# Patient Record
Sex: FEMALE | Race: WHITE | NOT HISPANIC OR LATINO | Employment: OTHER | URBAN - NONMETROPOLITAN AREA
[De-identification: names, ages, dates, MRNs, and addresses within clinical notes are randomized per-mention and may not be internally consistent; named-entity substitution may affect disease eponyms.]

---

## 2018-06-01 ENCOUNTER — OFFICE VISIT (OUTPATIENT)
Dept: OBSTETRICS AND GYNECOLOGY | Facility: CLINIC | Age: 63
End: 2018-06-01

## 2018-06-01 ENCOUNTER — PREP FOR SURGERY (OUTPATIENT)
Dept: OTHER | Facility: HOSPITAL | Age: 63
End: 2018-06-01

## 2018-06-01 VITALS
BODY MASS INDEX: 36.82 KG/M2 | SYSTOLIC BLOOD PRESSURE: 128 MMHG | DIASTOLIC BLOOD PRESSURE: 70 MMHG | WEIGHT: 221 LBS | HEIGHT: 65 IN

## 2018-06-01 DIAGNOSIS — R10.2 PELVIC PAIN: ICD-10-CM

## 2018-06-01 DIAGNOSIS — N94.10 DYSPAREUNIA IN FEMALE: ICD-10-CM

## 2018-06-01 DIAGNOSIS — N95.2 POSTMENOPAUSAL ATROPHIC VAGINITIS: ICD-10-CM

## 2018-06-01 DIAGNOSIS — R93.5 ABNORMAL ULTRASOUND OF ENDOMETRIUM: ICD-10-CM

## 2018-06-01 DIAGNOSIS — N95.0 POST-MENOPAUSAL BLEEDING: Primary | ICD-10-CM

## 2018-06-01 DIAGNOSIS — N95.1 MENOPAUSAL SYMPTOMS: ICD-10-CM

## 2018-06-01 DIAGNOSIS — N95.0 POSTMENOPAUSAL BLEEDING: Primary | ICD-10-CM

## 2018-06-01 DIAGNOSIS — D25.2 SUBSEROUS LEIOMYOMA OF UTERUS: ICD-10-CM

## 2018-06-01 PROCEDURE — 93005 ELECTROCARDIOGRAM TRACING: CPT | Performed by: OBSTETRICS & GYNECOLOGY

## 2018-06-01 PROCEDURE — 99204 OFFICE O/P NEW MOD 45 MIN: CPT | Performed by: OBSTETRICS & GYNECOLOGY

## 2018-06-01 RX ORDER — THYROID,PORK 32.5 MG
TABLET ORAL
Refills: 5 | COMMUNITY
Start: 2018-05-23

## 2018-06-01 RX ORDER — CHLORHEXIDINE GLUCONATE 0.12 MG/ML
RINSE ORAL
Refills: 0 | COMMUNITY
Start: 2018-04-10 | End: 2018-06-19

## 2018-06-01 RX ORDER — LEVOTHYROXINE SODIUM 75 MCG
TABLET ORAL DAILY
Refills: 5 | COMMUNITY
Start: 2018-04-26

## 2018-06-01 RX ORDER — ESTRADIOL 0.1 MG/G
CREAM VAGINAL
Qty: 1 EACH | Refills: 11 | Status: SHIPPED | OUTPATIENT
Start: 2018-06-01

## 2018-06-01 RX ORDER — OMEPRAZOLE 40 MG/1
CAPSULE, DELAYED RELEASE ORAL
Refills: 3 | COMMUNITY
Start: 2018-04-30

## 2018-06-01 RX ORDER — SODIUM CHLORIDE 0.9 % (FLUSH) 0.9 %
1-10 SYRINGE (ML) INJECTION AS NEEDED
Status: CANCELLED | OUTPATIENT
Start: 2018-06-01

## 2018-06-01 RX ORDER — KETOCONAZOLE 20 MG/ML
1 SHAMPOO TOPICAL EVERY OTHER DAY
Refills: 5 | COMMUNITY
Start: 2018-05-01

## 2018-06-01 NOTE — PROGRESS NOTES
Subjective  Chief Complaint   Patient presents with   • Pelvic Pain   • Vaginal Bleeding   • Dyspareunia     Patient is 62 y.o.  here for evaluation of recent  bleeding.  Pt gives history of being on HRT for the last several years.  Pt see's Dr. Yoo in [place].  Pt has labs done then has adjustments made to her hormone regimen which is compounded.  Pt is currently on 7 mg estradiol; pt reports dose had been 10 mg but was decreased recently secondary to the bleeding.  Pt is on progesterone 400 mg po.  Pt reports having temperature in balance.  Pt has had hot flashes, night sweats; now controlled with medication.  Pt reports recently having abdominal and pelvic pain; pt felt like spasms in ureter.  Pt also with pelvic pressure.  Pt also had urinary hesitancy as well as frequency.  Pt with no dysuria.  Pt saw primary care; had ua checked and vaginal cultures.  Pt treated for BV with oral antibiotics; ?flagyl.  Pt also reports urine grew out GBS; pt treated with emycin.  Pt reports symptoms improved.  Pt then attempted intercourse which she had not had in a while; pt reports severe pelvic pain and pain at vaginal introitus.  Pt reports pain was excruciating.  Pt reports the onset of vaginal bleeding 5 days later; pt reports passing clots and cramping like a menses.  Pt reports having TVS in past with Dr. Ferrari.  Pt not aware of any fibroid tumors.  Pt with no family history of gyn malignancy.    History  Past Medical History:   Diagnosis Date   • Asthma    • Disease of thyroid gland    • GERD (gastroesophageal reflux disease)    • Hormone replacement therapy    • Kidney stone    • Osteoarthritis    • Ovarian cyst    • Post-menopausal bleeding      No current outpatient prescriptions on file prior to visit.     No current facility-administered medications on file prior to visit.      Allergies   Allergen Reactions   • Bactrim [Sulfamethoxazole-Trimethoprim] Swelling   • Ciprofloxacin Swelling   •  "Morphine And Related Nausea And Vomiting   • Bacitracin Swelling   • Naproxen Dizziness   • Codeine Rash   • Gentamicin Rash   • Penicillins Rash   • Percocet [Oxycodone-Acetaminophen] Rash   • Sulfa Antibiotics Rash   • Ultram [Tramadol Hcl] Rash     Past Surgical History:   Procedure Laterality Date   •  SECTION  /85    X 3   • DILATATION AND CURETTAGE     • HX OVARIAN CYSTECTOMY     • KNEE SURGERY     • WISDOM TOOTH EXTRACTION       Family History   Problem Relation Age of Onset   • Hypertension Other    • Diabetes Other    • Coronary artery disease Other    • Stroke Other    • Thyroid disease Other      Social History     Social History   • Marital status:      Social History Main Topics   • Smoking status: Never Smoker   • Smokeless tobacco: Never Used   • Alcohol use Yes      Comment: 2   • Drug use: No   • Sexual activity: Yes     Partners: Male     Birth control/ protection: Post-menopausal     Other Topics Concern   • Not on file     Review of Systems  All systems were reviewed and negative except for:  ENT:  positive for ear ringing  Genitourinary: postivie for  frequency, urinary incontinence, abnormal vaginal bleeding, painful intercourse and pelvic pain  Endocrine: positive for  temperature intolerance     Objective  Vitals:    18 1440   BP: 128/70   Weight: 100 kg (221 lb)   Height: 165.1 cm (65\")     Physical Exam:  General Appearance: alert, appears stated age and cooperative  Head: normocephalic, without obvious abnormality and atraumatic  Eyes: lids and lashes normal, conjunctivae and sclerae normal, no icterus, no pallor, corneas clear and PERRLA  Ears: ears appear intact with no abnormalities noted  Nose: nares normal, septum midline, mucosa normal and no drainage  Neck: suppple, trachea midline and no thyromegaly  Lungs: clear to auscultation, respirations regular, respirations even and respirations unlabored  Heart: regular rhythm and normal rate, normal S1, S2, no " murmur, gallop, or rubs and no click  Breasts: Not performed.  Abdomen: normal bowel sounds, no masses, no hepatomegaly, no splenomegaly, soft non-tender, no guarding and no rebound tenderness  Pelvic: Not performed.  Extremities: moves extremities well, no edema, no cyanosis and no redness  Skin: no bleeding, bruising or rash and no lesions noted  Lymph Nodes: no palpable adenopathy  Neuro: CN II-X grossly intact; sensation intact  Psych: normal mood and affect, oriented to person, time and place, thought content organized and appropriate judgment  Lab Review   No data reviewed    Imaging   Pelvic ultrasound report  Pelvic ultrasound images independantly reviewed - TVS today shows AV uterus; ET 6 mm; subserosal leiomyoma noted measuring 3.2 cm; bilateral adnexa normal; no masses seen; normal vascular flow;  no free fluid noted    Assessment/Plan  Problem List Items Addressed This Visit        Genitourinary    Post-menopausal bleeding - Primary  Pt with recent episode of  bleeding.  Pt on HRT.  Long discussion with patient regarding treatment options but recommend endometrial sampling.  Pt desires to schedule D&C with dx hysteroscopy.  Risks, complications, benefits, and other alternatives discussed.    Relevant Orders    US Non-ob Transvaginal      Other Visit Diagnoses     Menopausal symptoms      Long discussion with patient regarding treatment options for HRT.  Also discussed bioidentical hormones as well as risk of HRT including results of WHI study.  All questions answered.    Abnormal ultrasound of endometrium      See plan above    Subserous leiomyoma of uterus      Small, subserosal leiomyoma as noted above.  Pt to sign to obtain copy of previous   TVS for comparison.  Plan pending results but at minimum recommend repeat TVS in 3-4 months to insure stability.    Postmenopausal atrophic vaginitis      Discussed various options for relief of atrophic vaginal symptoms related to menopause. Discussed local  therapy for treatment of vaginal symptoms only.  Discussed the different formulation options including cream, ring, and tablets.  Discussed the low risk of systemic absorption in postmenopausal women with atrophy using 25 mcgs of estradiol on a daily basis.  Recommend low dose use 2-3x/wk for maintenance of treatment.  Other treatment options were discussed including the use of water-based and silicone-based vaginal lubricants and moisturizers.  Also discussed was the FDA approved treatment option of ospemifene for moderate to severe dyspareunia.  Rx estrogen cream given as noted.  Pt also instructed to use periurethral area.    Dyspareunia in female      See plan above.  Pt also instructed in the use of water based lubricants.    Pelvic pain      See plan above        Follow up as discussed   This note was electronically signed.  Alena Beard M.D.

## 2018-06-19 ENCOUNTER — APPOINTMENT (OUTPATIENT)
Dept: PREADMISSION TESTING | Facility: HOSPITAL | Age: 63
End: 2018-06-19

## 2018-06-19 VITALS — HEIGHT: 62 IN | BODY MASS INDEX: 40.67 KG/M2 | WEIGHT: 221 LBS

## 2018-06-19 DIAGNOSIS — N95.0 POSTMENOPAUSAL BLEEDING: ICD-10-CM

## 2018-06-19 LAB
ANION GAP SERPL CALCULATED.3IONS-SCNC: 15.1 MMOL/L (ref 10–20)
BASOPHILS # BLD AUTO: 0.03 10*3/MM3 (ref 0–0.2)
BASOPHILS NFR BLD AUTO: 0.4 % (ref 0–2.5)
BILIRUB UR QL STRIP: NEGATIVE
BUN BLD-MCNC: 14 MG/DL (ref 7–20)
BUN/CREAT SERPL: 20 (ref 7.1–23.5)
CALCIUM SPEC-SCNC: 9.4 MG/DL (ref 8.4–10.2)
CHLORIDE SERPL-SCNC: 105 MMOL/L (ref 98–107)
CLARITY UR: CLEAR
CO2 SERPL-SCNC: 25 MMOL/L (ref 26–30)
COLOR UR: YELLOW
CREAT BLD-MCNC: 0.7 MG/DL (ref 0.6–1.3)
DEPRECATED RDW RBC AUTO: 46.6 FL (ref 37–54)
EOSINOPHIL # BLD AUTO: 0.28 10*3/MM3 (ref 0–0.7)
EOSINOPHIL NFR BLD AUTO: 4.1 % (ref 0–7)
ERYTHROCYTE [DISTWIDTH] IN BLOOD BY AUTOMATED COUNT: 13.5 % (ref 11.5–14.5)
GFR SERPL CREATININE-BSD FRML MDRD: 84 ML/MIN/1.73
GLUCOSE BLD-MCNC: 85 MG/DL (ref 74–98)
GLUCOSE UR STRIP-MCNC: NEGATIVE MG/DL
HCT VFR BLD AUTO: 45.3 % (ref 37–47)
HGB BLD-MCNC: 14.6 G/DL (ref 12–16)
HGB UR QL STRIP.AUTO: NEGATIVE
IMM GRANULOCYTES # BLD: 0.01 10*3/MM3 (ref 0–0.06)
IMM GRANULOCYTES NFR BLD: 0.1 % (ref 0–0.6)
KETONES UR QL STRIP: NEGATIVE
LEUKOCYTE ESTERASE UR QL STRIP.AUTO: NEGATIVE
LYMPHOCYTES # BLD AUTO: 2.5 10*3/MM3 (ref 0.6–3.4)
LYMPHOCYTES NFR BLD AUTO: 36.7 % (ref 10–50)
MCH RBC QN AUTO: 30.2 PG (ref 27–31)
MCHC RBC AUTO-ENTMCNC: 32.2 G/DL (ref 30–37)
MCV RBC AUTO: 93.6 FL (ref 81–99)
MONOCYTES # BLD AUTO: 0.46 10*3/MM3 (ref 0–0.9)
MONOCYTES NFR BLD AUTO: 6.8 % (ref 0–12)
NEUTROPHILS # BLD AUTO: 3.53 10*3/MM3 (ref 2–6.9)
NEUTROPHILS NFR BLD AUTO: 51.9 % (ref 37–80)
NITRITE UR QL STRIP: NEGATIVE
NRBC BLD MANUAL-RTO: 0 /100 WBC (ref 0–0)
PH UR STRIP.AUTO: 6 [PH] (ref 5–8)
PLATELET # BLD AUTO: 206 10*3/MM3 (ref 130–400)
PMV BLD AUTO: 11.4 FL (ref 6–12)
POTASSIUM BLD-SCNC: 4.1 MMOL/L (ref 3.5–5.1)
PROT UR QL STRIP: NEGATIVE
RBC # BLD AUTO: 4.84 10*6/MM3 (ref 4.2–5.4)
SODIUM BLD-SCNC: 141 MMOL/L (ref 137–145)
SP GR UR STRIP: 1.01 (ref 1–1.03)
UROBILINOGEN UR QL STRIP: NORMAL
WBC NRBC COR # BLD: 6.81 10*3/MM3 (ref 4.8–10.8)

## 2018-06-19 PROCEDURE — 81003 URINALYSIS AUTO W/O SCOPE: CPT | Performed by: OBSTETRICS & GYNECOLOGY

## 2018-06-19 PROCEDURE — 93005 ELECTROCARDIOGRAM TRACING: CPT | Performed by: OBSTETRICS & GYNECOLOGY

## 2018-06-19 PROCEDURE — 80048 BASIC METABOLIC PNL TOTAL CA: CPT | Performed by: OBSTETRICS & GYNECOLOGY

## 2018-06-19 PROCEDURE — 36415 COLL VENOUS BLD VENIPUNCTURE: CPT

## 2018-06-19 PROCEDURE — 85025 COMPLETE CBC W/AUTO DIFF WBC: CPT | Performed by: OBSTETRICS & GYNECOLOGY

## 2018-07-02 PROCEDURE — S0260 H&P FOR SURGERY: HCPCS | Performed by: OBSTETRICS & GYNECOLOGY

## 2018-07-03 ENCOUNTER — HOSPITAL ENCOUNTER (OUTPATIENT)
Facility: HOSPITAL | Age: 63
Setting detail: HOSPITAL OUTPATIENT SURGERY
Discharge: HOME | End: 2018-07-03
Attending: OBSTETRICS & GYNECOLOGY | Admitting: OBSTETRICS & GYNECOLOGY

## 2018-07-03 ENCOUNTER — ANESTHESIA (OUTPATIENT)
Dept: PERIOP | Facility: HOSPITAL | Age: 63
End: 2018-07-03

## 2018-07-03 ENCOUNTER — ANESTHESIA EVENT (OUTPATIENT)
Dept: PERIOP | Facility: HOSPITAL | Age: 63
End: 2018-07-03

## 2018-07-03 VITALS
OXYGEN SATURATION: 98 % | SYSTOLIC BLOOD PRESSURE: 154 MMHG | TEMPERATURE: 97.8 F | HEART RATE: 60 BPM | RESPIRATION RATE: 18 BRPM | DIASTOLIC BLOOD PRESSURE: 60 MMHG

## 2018-07-03 DIAGNOSIS — N95.0 POSTMENOPAUSAL BLEEDING: ICD-10-CM

## 2018-07-03 PROCEDURE — 25010000002 DEXAMETHASONE PER 1 MG: Performed by: NURSE ANESTHETIST, CERTIFIED REGISTERED

## 2018-07-03 PROCEDURE — 58558 HYSTEROSCOPY BIOPSY: CPT | Performed by: OBSTETRICS & GYNECOLOGY

## 2018-07-03 PROCEDURE — 25010000002 MIDAZOLAM PER 1 MG: Performed by: NURSE ANESTHETIST, CERTIFIED REGISTERED

## 2018-07-03 PROCEDURE — 25010000002 ONDANSETRON PER 1 MG: Performed by: NURSE ANESTHETIST, CERTIFIED REGISTERED

## 2018-07-03 PROCEDURE — 25010000002 PROPOFOL 10 MG/ML EMULSION: Performed by: NURSE ANESTHETIST, CERTIFIED REGISTERED

## 2018-07-03 PROCEDURE — 25010000002 KETOROLAC TROMETHAMINE PER 15 MG: Performed by: NURSE ANESTHETIST, CERTIFIED REGISTERED

## 2018-07-03 RX ORDER — SODIUM CHLORIDE 0.9 % (FLUSH) 0.9 %
1-10 SYRINGE (ML) INJECTION AS NEEDED
Status: DISCONTINUED | OUTPATIENT
Start: 2018-07-03 | End: 2018-07-03 | Stop reason: HOSPADM

## 2018-07-03 RX ORDER — DEXAMETHASONE SODIUM PHOSPHATE 4 MG/ML
INJECTION, SOLUTION INTRA-ARTICULAR; INTRALESIONAL; INTRAMUSCULAR; INTRAVENOUS; SOFT TISSUE AS NEEDED
Status: DISCONTINUED | OUTPATIENT
Start: 2018-07-03 | End: 2018-07-03 | Stop reason: SURG

## 2018-07-03 RX ORDER — SODIUM CHLORIDE 0.9 % (FLUSH) 0.9 %
3 SYRINGE (ML) INJECTION AS NEEDED
Status: DISCONTINUED | OUTPATIENT
Start: 2018-07-03 | End: 2018-07-03 | Stop reason: HOSPADM

## 2018-07-03 RX ORDER — MIDAZOLAM HYDROCHLORIDE 1 MG/ML
INJECTION INTRAMUSCULAR; INTRAVENOUS AS NEEDED
Status: DISCONTINUED | OUTPATIENT
Start: 2018-07-03 | End: 2018-07-03 | Stop reason: SURG

## 2018-07-03 RX ORDER — HYDROCODONE BITARTRATE AND ACETAMINOPHEN 5; 325 MG/1; MG/1
1 TABLET ORAL EVERY 6 HOURS PRN
Qty: 12 TABLET | Refills: 0 | Status: SHIPPED | OUTPATIENT
Start: 2018-07-03 | End: 2018-07-03 | Stop reason: HOSPADM

## 2018-07-03 RX ORDER — KETOROLAC TROMETHAMINE 30 MG/ML
INJECTION, SOLUTION INTRAMUSCULAR; INTRAVENOUS AS NEEDED
Status: DISCONTINUED | OUTPATIENT
Start: 2018-07-03 | End: 2018-07-03 | Stop reason: SURG

## 2018-07-03 RX ORDER — ONDANSETRON 2 MG/ML
4 INJECTION INTRAMUSCULAR; INTRAVENOUS ONCE
Status: COMPLETED | OUTPATIENT
Start: 2018-07-03 | End: 2018-07-03

## 2018-07-03 RX ORDER — PROMETHAZINE HYDROCHLORIDE 25 MG/ML
12.5 INJECTION, SOLUTION INTRAMUSCULAR; INTRAVENOUS ONCE AS NEEDED
Status: DISCONTINUED | OUTPATIENT
Start: 2018-07-03 | End: 2018-07-03 | Stop reason: HOSPADM

## 2018-07-03 RX ORDER — ONDANSETRON 2 MG/ML
4 INJECTION INTRAMUSCULAR; INTRAVENOUS ONCE AS NEEDED
Status: DISCONTINUED | OUTPATIENT
Start: 2018-07-03 | End: 2018-07-03 | Stop reason: HOSPADM

## 2018-07-03 RX ORDER — ONDANSETRON 2 MG/ML
INJECTION INTRAMUSCULAR; INTRAVENOUS AS NEEDED
Status: DISCONTINUED | OUTPATIENT
Start: 2018-07-03 | End: 2018-07-03 | Stop reason: SURG

## 2018-07-03 RX ORDER — SODIUM CHLORIDE 0.9 % (FLUSH) 0.9 %
3 SYRINGE (ML) INJECTION AS NEEDED
Status: DISCONTINUED | OUTPATIENT
Start: 2018-07-03 | End: 2018-07-03

## 2018-07-03 RX ORDER — MEPERIDINE HYDROCHLORIDE 50 MG/ML
INJECTION INTRAMUSCULAR; INTRAVENOUS; SUBCUTANEOUS AS NEEDED
Status: DISCONTINUED | OUTPATIENT
Start: 2018-07-03 | End: 2018-07-03 | Stop reason: SURG

## 2018-07-03 RX ORDER — PROMETHAZINE HYDROCHLORIDE 12.5 MG/1
12.5 TABLET ORAL ONCE AS NEEDED
Status: DISCONTINUED | OUTPATIENT
Start: 2018-07-03 | End: 2018-07-03 | Stop reason: HOSPADM

## 2018-07-03 RX ORDER — ONDANSETRON 4 MG/1
4 TABLET, FILM COATED ORAL ONCE AS NEEDED
Status: DISCONTINUED | OUTPATIENT
Start: 2018-07-03 | End: 2018-07-03 | Stop reason: HOSPADM

## 2018-07-03 RX ORDER — MEPERIDINE HYDROCHLORIDE 50 MG/1
50 TABLET ORAL EVERY 6 HOURS PRN
Qty: 12 TABLET | Refills: 0 | Status: SHIPPED | OUTPATIENT
Start: 2018-07-03

## 2018-07-03 RX ORDER — ONDANSETRON HYDROCHLORIDE 8 MG/1
8 TABLET, FILM COATED ORAL EVERY 8 HOURS PRN
Qty: 15 TABLET | Refills: 0 | Status: SHIPPED | OUTPATIENT
Start: 2018-07-03

## 2018-07-03 RX ORDER — SODIUM CHLORIDE, SODIUM LACTATE, POTASSIUM CHLORIDE, CALCIUM CHLORIDE 600; 310; 30; 20 MG/100ML; MG/100ML; MG/100ML; MG/100ML
1000 INJECTION, SOLUTION INTRAVENOUS CONTINUOUS
Status: DISCONTINUED | OUTPATIENT
Start: 2018-07-03 | End: 2018-07-03 | Stop reason: HOSPADM

## 2018-07-03 RX ORDER — PROPOFOL 10 MG/ML
VIAL (ML) INTRAVENOUS AS NEEDED
Status: DISCONTINUED | OUTPATIENT
Start: 2018-07-03 | End: 2018-07-03 | Stop reason: SURG

## 2018-07-03 RX ORDER — SODIUM CHLORIDE, SODIUM LACTATE, POTASSIUM CHLORIDE, CALCIUM CHLORIDE 600; 310; 30; 20 MG/100ML; MG/100ML; MG/100ML; MG/100ML
1000 INJECTION, SOLUTION INTRAVENOUS CONTINUOUS
Status: CANCELLED | OUTPATIENT
Start: 2018-07-03

## 2018-07-03 RX ADMIN — SODIUM CHLORIDE, POTASSIUM CHLORIDE, SODIUM LACTATE AND CALCIUM CHLORIDE 1000 ML: 600; 310; 30; 20 INJECTION, SOLUTION INTRAVENOUS at 09:57

## 2018-07-03 RX ADMIN — PROPOFOL 50 MG: 10 INJECTION, EMULSION INTRAVENOUS at 12:54

## 2018-07-03 RX ADMIN — MEPERIDINE HYDROCHLORIDE 25 MG: 50 INJECTION, SOLUTION INTRAMUSCULAR; INTRAVENOUS; SUBCUTANEOUS at 12:55

## 2018-07-03 RX ADMIN — KETOROLAC TROMETHAMINE 30 MG: 30 INJECTION, SOLUTION INTRAMUSCULAR at 12:52

## 2018-07-03 RX ADMIN — SODIUM CHLORIDE, POTASSIUM CHLORIDE, SODIUM LACTATE AND CALCIUM CHLORIDE: 600; 310; 30; 20 INJECTION, SOLUTION INTRAVENOUS at 13:09

## 2018-07-03 RX ADMIN — MEPERIDINE HYDROCHLORIDE 25 MG: 50 INJECTION, SOLUTION INTRAMUSCULAR; INTRAVENOUS; SUBCUTANEOUS at 12:46

## 2018-07-03 RX ADMIN — ONDANSETRON 4 MG: 2 INJECTION, SOLUTION INTRAMUSCULAR; INTRAVENOUS at 10:00

## 2018-07-03 RX ADMIN — DEXAMETHASONE SODIUM PHOSPHATE 4 MG: 4 INJECTION, SOLUTION INTRAMUSCULAR; INTRAVENOUS at 12:52

## 2018-07-03 RX ADMIN — PROPOFOL 50 MG: 10 INJECTION, EMULSION INTRAVENOUS at 12:58

## 2018-07-03 RX ADMIN — PROPOFOL 50 MG: 10 INJECTION, EMULSION INTRAVENOUS at 12:49

## 2018-07-03 RX ADMIN — MIDAZOLAM HYDROCHLORIDE 2 MG: 1 INJECTION, SOLUTION INTRAMUSCULAR; INTRAVENOUS at 12:46

## 2018-07-03 RX ADMIN — PROPOFOL 50 MG: 10 INJECTION, EMULSION INTRAVENOUS at 13:02

## 2018-07-03 RX ADMIN — ONDANSETRON 4 MG: 2 INJECTION INTRAMUSCULAR; INTRAVENOUS at 12:52

## 2018-07-03 NOTE — DISCHARGE INSTRUCTIONS
Please follow all post op instructions and follow up appointment time from your physician's office included in your discharge packet.    Pelvic rest is best described as not putting anything in your vagina. This includes tampons, douching, tub bathing or sexual activity.      No pushing, pulling, tugging,  heavy lifting, or strenuous activity.  No major decision making, driving, or drinking alcoholic beverages for 24 hours. ( due to the medications you have  received)  Always use good hand hygiene/washing techniques.  NO driving while taking pain medications.    To assist you in voiding:  Drink plenty of fluids  Listen to running water while attempting to void.    If you are unable to urinate and you have an uncomfortable urge to void or it has been   6 hours since you were discharged, return to the Emergency Room

## 2018-07-03 NOTE — H&P
" Wei  Ban Calloway  : 1955  MRN: [id number]  CSN: [id number]    History and Physical    Subjective   Ban Calloway is a 62 y.o. year old  who present for surgery due to history of recent  bleeding.  Pt has been on HRT.  She had her dose of estrogen changed recently.  Pt had onset of  bleeding.  Pt had TVS done which showed small leiomyoma and thickened endometrium.  Pt here for further evaluation with D&C/dx hysteroscopy.    History  Past Medical History:   Diagnosis Date   • Asthma    • Cervical dystonia     causes head tremors   • Disease of thyroid gland    • Elevated cholesterol    • GERD (gastroesophageal reflux disease)    • Hormone replacement therapy    • Kidney stone    • Osteoarthritis    • Ovarian cyst    • PONV (postoperative nausea and vomiting)    • Post-menopausal bleeding    • PTSD (post-traumatic stress disorder)      No current facility-administered medications on file prior to encounter.      Current Outpatient Prescriptions on File Prior to Encounter   Medication Sig Dispense Refill   • estradiol (ESTRACE VAGINAL) 0.1 MG/GM vaginal cream Insert 1 gm intravaginally 1-3 times each week 1 each 11   • ketoconazole (NIZORAL) 2 % shampoo Apply 1 application topically Every Other Day.  5   • NATURE-THROID 32.5 MG tablet TK 1 T PO QAM AND 1 T PO AT NOON  5   • NON FORMULARY RINA BLOCK INJECTIONS     • omeprazole (priLOSEC) 40 MG capsule TK 1 C PO D  3   • progesterone (PROMETRIUM) 200 MG capsule Take 200 mg by mouth 2 (Two) Times a Day.     • SYNTHROID 75 MCG tablet Take  by mouth Daily.  5     Allergies   Allergen Reactions   • Bactrim [Sulfamethoxazole-Trimethoprim] Swelling   • Ciprofloxacin Swelling   • Morphine And Related Nausea And Vomiting   • Bacitracin Swelling   • Naproxen Dizziness   • Codeine Rash   • Gentamicin Rash   • Latex Rash     Patient states \" my skin comes off\"   • Penicillins Rash   • Percocet [Oxycodone-Acetaminophen] Rash   • Sulfa Antibiotics " Rash   • Ultram [Tramadol Hcl] Rash     Past Surgical History:   Procedure Laterality Date   •  SECTION  /85    X 3   • DILATATION AND CURETTAGE     • HX OVARIAN CYSTECTOMY     • KNEE SURGERY     • WISDOM TOOTH EXTRACTION       Family History   Problem Relation Age of Onset   • Hypertension Other    • Diabetes Other    • Coronary artery disease Other    • Stroke Other    • Thyroid disease Other      Social History     Social History   • Marital status:      Social History Main Topics   • Smoking status: Never Smoker   • Smokeless tobacco: Never Used   • Alcohol use Yes      Comment: 2 wine or beer occasional mixed drink   • Drug use: No   • Sexual activity: Yes     Partners: Male     Birth control/ protection: Post-menopausal     Other Topics Concern   • Not on file     Review of Systems  The following systems were reviewed and negative:  constitution, eyes, ENT, respiratory, cardiovascular, gastrointestinal, genitourinary, integument, breast, hematologic / lymphatic, musculoskeletal, neurological, behavioral/psych, endocrine and allergies / immunologic     Objective  Recent Vitals       2018             BP: 128/70    Weight: 100 kg (221 lb)    BMI (Calculated): 36.8        Physical Exam:  General Appearance: alert, appears stated age and cooperative  Head: normocephalic, without obvious abnormality and atraumatic  Eyes: lids and lashes normal, conjunctivae and sclerae normal, no icterus, no pallor and corneas clear  Lungs: clear to auscultation, respirations regular, respirations even and respirations unlabored  Heart: regular rhythm and normal rate, normal S1, S2, no murmur, gallop, or rubs and no click  Abdomen: normal bowel sounds, no masses, no hepatomegaly, no splenomegaly, soft non-tender, no guarding and no rebound tenderness  Extremities: moves extremities well, no edema, no cyanosis and no redness  Skin: no bleeding, bruising or rash and no lesions noted  Psych: normal mood and  affect, oriented to person, time and place, thought content organized and appropriate judgment    Labs  Lab Results   Component Value Date     06/19/2018    HGB 14.6 06/19/2018    HCT 45.3 06/19/2018    WBC 6.81 06/19/2018     06/19/2018    K 4.1 06/19/2018     06/19/2018    CO2 25.0 (L) 06/19/2018    BUN 14 06/19/2018    CREATININE 0.70 06/19/2018    GLUCOSE 85 06/19/2018    ALBUMIN 4.7 08/26/2014    CALCIUM 9.4 06/19/2018    AST 26 08/26/2014    ALT 37 08/26/2014    BILITOT 0.5 08/26/2014      Lab Results   Component Value Date    SPECGRAVUR 1.010 06/19/2018    KETONESU Negative 06/19/2018    BLOODU Negative 06/19/2018    LEUKOCYTESUR Negative 06/19/2018    NITRITEU Negative 06/19/2018        No results found for: URINECX     Assessment   1.  bleeding  2. Abnormal endometrium on ultrasound     Plan   1. D&C with dx hysteroscopy.  2. ABx and DVT prophylaxis as indicated.  3. Risks, complications, benefits, and other alternatives discussed.  4. All questions answered and pt in agreement with plan.    Alena Beard M.D.  7/2/2018  9:15 PM

## 2018-07-03 NOTE — OP NOTE
Eriberto Calloway  : 1955  MRN: [id number]  [facility]: [id number]  Date: 7/3/2018    Operative Report    Pre-op Diagnosis:  Postmenopausal bleeding [N95.0]   Thickened endometrium on ultrasound   Post-op Diagnosis:  Post-Op Diagnosis Codes:     * Postmenopausal bleeding [N95.0]       Cervical stenosis   Procedure: Procedure(s):  DILATATION AND CURETTAGE HYSTEROSCOPY   Surgeon: Alena Beard M.D.   Assist: None   Anesthesia: Sedation with 1% lidocaine local   Estimated Blood Loss: 5 mls   ABx: none   Specimens:  endometrial   Findings: Stenotic cervix with minimal amount of tissue seen   Complications: none   Indications:  See H&P         Description of Procedure:  After the appropriate time out and adequate dosing of her anesthesia, the patient had been prepped and draped in the usual sterile fashion.  She was placed in the dorsal lithotomy position using Rohan stirrups.  The bladder had been drained with a red rubber catheter per nursing.  A weighted speculum was placed in the vagina.  The anterior lip of the cervix was grasped with a single-tooth tenaculum.  The cervix was injected at the 3 o'clock and 9 o'clock position with 1% lidocaine plain without any complications.  The cervix was then progressively dilated using Medina dilators.  Rigid hysteroscopy was then performed with the above findings noted.  Sharp curettings were then obtained with a good cry throughout with tissue retrieved and sent for pathologic specimen.  The cervical tenaculum was removed and the cervix was noted to be hemostatic after the application of 2-0 chromic suture in a figure-of-eight fashion.  All instrument and sponge counts were correct at the end of the procedure.  The patient tolerated the procedure well.  There were no complications.  She was taken to the postoperative recovery room in stable condition.    Alena Beard M.D.  7/3/2018  1:12 PM

## 2018-07-03 NOTE — ANESTHESIA PREPROCEDURE EVALUATION
Anesthesia Evaluation     Patient summary reviewed and Nursing notes reviewed   history of anesthetic complications: PONV  NPO Solid Status: > 8 hours             Airway   Mallampati: I  TM distance: >3 FB  Neck ROM: full  no difficulty expected  Dental - normal exam     Pulmonary - normal exam   (+) asthma (Environmental triggers), shortness of breath,   Cardiovascular - normal exam    Rhythm: regular  Rate: normal    (+) hyperlipidemia,       Neuro/Psych  (+) headaches,     GI/Hepatic/Renal/Endo    (+) obesity, morbid obesity (BKI 40), GERD well controlled,  renal disease stones,     Musculoskeletal     Abdominal  - normal exam    Abdomen: soft.  Bowel sounds: normal.   Substance History - negative use     OB/GYN negative ob/gyn ROS         Other   (+) arthritis     ROS/Med Hx Other: Cervical dystonia creating head tremors. Patient receives botox injection as Tx                  Anesthesia Plan    ASA 3     MAC   (Risks and benefits discussed including risk of aspiration, recall and dental damage. All patient questions answered. Will continue with POC.)  intravenous induction   Anesthetic plan and risks discussed with patient.

## 2018-07-03 NOTE — ANESTHESIA POSTPROCEDURE EVALUATION
Patient: Ban Calloway    Procedure Summary     Date:  07/03/18 Room / Location:  [facility] OR  /  [facility]    Anesthesia Start:  1244 Anesthesia Stop:  1321    Procedure:  DILATATION AND CURETTAGE HYSTEROSCOPY (N/A Uterus) Diagnosis:       Postmenopausal bleeding      (Postmenopausal bleeding [N95.0])    Surgeon:  Alena Beard MD Provider:  Lorena Cotton CRNA    Anesthesia Type:  MAC ASA Status:  3          Anesthesia Type: MAC  Last vitals  BP   154/76 (07/03/18 1320)   Temp   97.8 °F (36.6 °C) (07/03/18 1320)   Pulse   73 (07/03/18 1320)   Resp   16 (07/03/18 1320)     SpO2   96 % (07/03/18 1320)     Post Anesthesia Care and Evaluation    Patient location during evaluation: PHASE II  Patient participation: complete - patient participated  Level of consciousness: awake and alert  Pain score: 0  Pain management: satisfactory to patient  Airway patency: patent  Anesthetic complications: No anesthetic complications  PONV Status: none  Cardiovascular status: acceptable and stable  Respiratory status: acceptable  Hydration status: acceptable

## 2018-07-07 LAB
LAB AP CASE REPORT: NORMAL
PATH REPORT.FINAL DX SPEC: NORMAL

## 2018-07-16 ENCOUNTER — OFFICE VISIT (OUTPATIENT)
Dept: OBSTETRICS AND GYNECOLOGY | Facility: CLINIC | Age: 63
End: 2018-07-16

## 2018-07-16 VITALS
HEIGHT: 62 IN | BODY MASS INDEX: 40.78 KG/M2 | SYSTOLIC BLOOD PRESSURE: 148 MMHG | DIASTOLIC BLOOD PRESSURE: 84 MMHG | WEIGHT: 221.6 LBS

## 2018-07-16 DIAGNOSIS — D25.9 UTERINE LEIOMYOMA, UNSPECIFIED LOCATION: ICD-10-CM

## 2018-07-16 DIAGNOSIS — Z09 POSTOP CHECK: Primary | ICD-10-CM

## 2018-07-16 PROCEDURE — 99212 OFFICE O/P EST SF 10 MIN: CPT | Performed by: PHYSICIAN ASSISTANT

## 2018-07-16 RX ORDER — BACLOFEN 10 MG/1
TABLET ORAL
Refills: 3 | COMMUNITY
Start: 2018-06-06

## 2018-07-16 NOTE — PROGRESS NOTES
Subjective   Chief Complaint   Patient presents with   • Post-op     13 days post op D&C Hysteroscopy; doing well       Ban Calloway is a 62 y.o. year old  presenting to be seen for post op visit  She is 13 days post op D&C hysteroscopy  Pathology is benign  She has done well post op with normal bowel and bladder function  Had minimal bleeding post op    Past Medical History:   Diagnosis Date   • Asthma    • Cervical dystonia     causes head tremors   • Disease of thyroid gland    • Elevated cholesterol    • GERD (gastroesophageal reflux disease)    • Hormone replacement therapy    • Kidney stone    • Osteoarthritis    • Ovarian cyst    • PONV (postoperative nausea and vomiting)    • Post-menopausal bleeding    • PTSD (post-traumatic stress disorder)         Current Outpatient Prescriptions:   •  baclofen (LIORESAL) 10 MG tablet, TK 1 T PO BID, Disp: , Rfl: 3  •  ketoconazole (NIZORAL) 2 % shampoo, Apply 1 application topically Every Other Day., Disp: , Rfl: 5  •  NATURE-THROID 32.5 MG tablet, TK 1 T PO QAM AND 1 T PO AT NOON, Disp: , Rfl: 5  •  NON FORMULARY, RINA BLOCK INJECTIONS, Disp: , Rfl:   •  NON FORMULARY, Apply 1 application topically Daily. Testosterone cream, Disp: , Rfl:   •  omeprazole (priLOSEC) 40 MG capsule, TK 1 C PO D, Disp: , Rfl: 3  •  progesterone (PROMETRIUM) 200 MG capsule, Take 200 mg by mouth 2 (Two) Times a Day., Disp: , Rfl:   •  SYNTHROID 75 MCG tablet, Take  by mouth Daily., Disp: , Rfl: 5  •  estradiol (ESTRACE VAGINAL) 0.1 MG/GM vaginal cream, Insert 1 gm intravaginally 1-3 times each week, Disp: 1 each, Rfl: 11  •  meperidine (DEMEROL) 50 MG tablet, Take 1 tablet by mouth Every 6 (Six) Hours As Needed for Moderate Pain ., Disp: 12 tablet, Rfl: 0  •  ondansetron (ZOFRAN) 8 MG tablet, Take 1 tablet by mouth Every 8 (Eight) Hours As Needed for Nausea or Vomiting, Disp: 15 tablet, Rfl: 0   Allergies   Allergen Reactions   • Bactrim [Sulfamethoxazole-Trimethoprim] Swelling   •  "Ciprofloxacin Swelling   • Morphine And Related Nausea And Vomiting   • Bacitracin Swelling   • Codeine Rash   • Gentamicin Rash   • Latex Rash     Patient states \" my skin comes off\"   • Naproxen Dizziness   • Penicillins Rash   • Percocet [Oxycodone-Acetaminophen] Rash   • Sulfa Antibiotics Rash   • Ultram [Tramadol Hcl] Rash      Past Surgical History:   Procedure Laterality Date   •  SECTION  /85    X 3   • D&C HYSTEROSCOPY N/A 7/3/2018    Procedure: DILATATION AND CURETTAGE HYSTEROSCOPY;  Surgeon: Alena Beard MD;  Location: [facility];  Service: Obstetrics/Gynecology   • DILATATION AND CURETTAGE     • HX OVARIAN CYSTECTOMY     • KNEE SURGERY     • WISDOM TOOTH EXTRACTION        Social History     Social History   • Marital status:      Spouse name: N/A   • Number of children: N/A   • Years of education: N/A     Occupational History   • Not on file.     Social History Main Topics   • Smoking status: Never Smoker   • Smokeless tobacco: Never Used   • Alcohol use Yes      Comment: 2 wine or beer occasional mixed drink   • Drug use: No   • Sexual activity: Yes     Partners: Male     Birth control/ protection: Post-menopausal     Other Topics Concern   • Not on file     Social History Narrative   • No narrative on file      Family History   Problem Relation Age of Onset   • Hypertension Other    • Diabetes Other    • Coronary artery disease Other    • Stroke Other    • Thyroid disease Other        Review of Systems   Constitutional: Negative.    Gastrointestinal: Negative.    Genitourinary: Negative.            Objective   /84   Ht 157.5 cm (62\")   Wt 101 kg (221 lb 9.6 oz)   Breastfeeding? No   BMI 40.53 kg/m²     Physical Exam         Assessment and Plan  Ban was seen today for post-op.    Diagnoses and all orders for this visit:    Postop check      Patient Instructions   Patient to follow up in 4 months for TVS for fibroid surveillance.  Records received from pelvic ultrasound " done 2015 per Dr. Ferrari with no mention of fibroid then so fibroid appears to have developed since that ultrasound             This note was electronically signed.    Liberty Gonzalez PA-C   July 16, 2018

## 2018-07-16 NOTE — PATIENT INSTRUCTIONS
Patient to follow up in 4 months for TVS for fibroid surveillance.  Records received from pelvic ultrasound done 2015 per Dr. Ferrari with no mention of fibroid then so fibroid appears to have developed since that ultrasound

## 2018-10-16 DIAGNOSIS — M25.552 ARTHRALGIA OF LEFT HIP: Primary | ICD-10-CM

## 2018-10-17 ENCOUNTER — OFFICE VISIT (OUTPATIENT)
Dept: ORTHOPEDIC SURGERY | Facility: CLINIC | Age: 63
End: 2018-10-17

## 2018-10-17 VITALS — RESPIRATION RATE: 18 BRPM | HEIGHT: 62 IN | BODY MASS INDEX: 40.3 KG/M2 | WEIGHT: 219 LBS

## 2018-10-17 DIAGNOSIS — M25.552 ARTHRALGIA OF LEFT HIP: Primary | ICD-10-CM

## 2018-10-17 DIAGNOSIS — M51.36 DDD (DEGENERATIVE DISC DISEASE), LUMBAR: ICD-10-CM

## 2018-10-17 DIAGNOSIS — M70.62 GREATER TROCHANTERIC BURSITIS, LEFT: ICD-10-CM

## 2018-10-17 PROCEDURE — 99203 OFFICE O/P NEW LOW 30 MIN: CPT | Performed by: PHYSICIAN ASSISTANT

## 2018-10-17 PROCEDURE — 20610 DRAIN/INJ JOINT/BURSA W/O US: CPT | Performed by: PHYSICIAN ASSISTANT

## 2018-10-17 RX ORDER — LEVOTHYROXINE SODIUM 0.05 MG/1
TABLET ORAL
Refills: 5 | COMMUNITY
Start: 2018-09-26

## 2018-10-17 RX ORDER — OMEPRAZOLE 20 MG/1
CAPSULE, DELAYED RELEASE ORAL
Refills: 6 | COMMUNITY
Start: 2018-08-01

## 2018-10-17 RX ORDER — METRONIDAZOLE 10 MG/G
GEL TOPICAL
COMMUNITY
Start: 2018-07-24

## 2018-10-17 RX ORDER — LIDOCAINE HYDROCHLORIDE 10 MG/ML
2 INJECTION, SOLUTION EPIDURAL; INFILTRATION; INTRACAUDAL; PERINEURAL
Status: COMPLETED | OUTPATIENT
Start: 2018-10-17 | End: 2018-10-17

## 2018-10-17 RX ORDER — METHYLPREDNISOLONE ACETATE 40 MG/ML
40 INJECTION, SUSPENSION INTRA-ARTICULAR; INTRALESIONAL; INTRAMUSCULAR; SOFT TISSUE
Status: COMPLETED | OUTPATIENT
Start: 2018-10-17 | End: 2018-10-17

## 2018-10-17 RX ADMIN — METHYLPREDNISOLONE ACETATE 40 MG: 40 INJECTION, SUSPENSION INTRA-ARTICULAR; INTRALESIONAL; INTRAMUSCULAR; SOFT TISSUE at 14:56

## 2018-10-17 RX ADMIN — LIDOCAINE HYDROCHLORIDE 2 ML: 10 INJECTION, SOLUTION EPIDURAL; INFILTRATION; INTRACAUDAL; PERINEURAL at 14:56

## 2018-10-17 NOTE — PROGRESS NOTES
Subjective   Patient ID: Ban Calloway is a 62 y.o. right hand dominant female  Pain of the Left Hip (Patient is here today for hip pain that started 3 months ago, she states the pain is bursitis. Her pain level is 7/10 but at night it's 9/10.)         History of Present Illness  Patient presents with complaints of left lateral hip pain, left posterior hip pain with radiating pain into the left foot.  She states this is been ongoing for the last 3 months.  There has been no injury or trauma.  At times she does have tingling into the left lower leg.    Patient has used ice as well as topical medication and even oral anti-inflammatories with very little relief                                                 Past Medical History:   Diagnosis Date   • Asthma    • Cervical dystonia     causes head tremors   • Disease of thyroid gland    • Elevated cholesterol    • GERD (gastroesophageal reflux disease)    • Hormone replacement therapy    • Kidney stone    • Osteoarthritis    • Ovarian cyst    • PONV (postoperative nausea and vomiting)    • Post-menopausal bleeding    • PTSD (post-traumatic stress disorder)         Past Surgical History:   Procedure Laterality Date   •  SECTION  1980/82/85    X 3   • D&C HYSTEROSCOPY N/A 7/3/2018    Procedure: DILATATION AND CURETTAGE HYSTEROSCOPY;  Surgeon: Alena Beard MD;  Location: [facility];  Service: Obstetrics/Gynecology   • DILATATION AND CURETTAGE     • HX OVARIAN CYSTECTOMY     • KNEE SURGERY     • WISDOM TOOTH EXTRACTION         Family History   Problem Relation Age of Onset   • Hypertension Other    • Diabetes Other    • Coronary artery disease Other    • Stroke Other    • Thyroid disease Other        Social History     Social History   • Marital status:      Spouse name: N/A   • Number of children: N/A   • Years of education: N/A     Occupational History   • Not on file.     Social History Main Topics   • Smoking status: Never Smoker   • Smokeless tobacco:  Never Used   • Alcohol use Yes      Comment: 2 wine or beer occasional mixed drink   • Drug use: No   • Sexual activity: Yes     Partners: Male     Birth control/ protection: Post-menopausal     Other Topics Concern   • Not on file     Social History Narrative   • No narrative on file         Current Outpatient Prescriptions:   •  baclofen (LIORESAL) 10 MG tablet, TK 1 T PO BID, Disp: , Rfl: 3  •  estradiol (ESTRACE VAGINAL) 0.1 MG/GM vaginal cream, Insert 1 gm intravaginally 1-3 times each week, Disp: 1 each, Rfl: 11  •  ketoconazole (NIZORAL) 2 % shampoo, Apply 1 application topically Every Other Day., Disp: , Rfl: 5  •  levothyroxine (SYNTHROID, LEVOTHROID) 50 MCG tablet, TK 1 T PO QD ON AN EMPTY STOMACH, Disp: , Rfl: 5  •  meperidine (DEMEROL) 50 MG tablet, Take 1 tablet by mouth Every 6 (Six) Hours As Needed for Moderate Pain ., Disp: 12 tablet, Rfl: 0  •  metroNIDAZOLE (METROGEL) 1 % gel, , Disp: , Rfl:   •  NATURE-THROID 32.5 MG tablet, TK 1 T PO QAM AND 1 T PO AT NOON, Disp: , Rfl: 5  •  NATURE-THROID 65 MG tablet, TK 1 T PO QAM ON EMPTY STOMACH, Disp: , Rfl: 5  •  NON FORMULARY, RINA BLOCK INJECTIONS, Disp: , Rfl:   •  NON FORMULARY, Apply 1 application topically Daily. Testosterone cream, Disp: , Rfl:   •  omeprazole (priLOSEC) 20 MG capsule, TK 1 C PO BID, Disp: , Rfl: 6  •  omeprazole (priLOSEC) 40 MG capsule, TK 1 C PO D, Disp: , Rfl: 3  •  ondansetron (ZOFRAN) 8 MG tablet, Take 1 tablet by mouth Every 8 (Eight) Hours As Needed for Nausea or Vomiting, Disp: 15 tablet, Rfl: 0  •  PROAIR  (90 Base) MCG/ACT inhaler, , Disp: , Rfl:   •  progesterone (PROMETRIUM) 200 MG capsule, Take 200 mg by mouth 2 (Two) Times a Day., Disp: , Rfl:   •  SYNTHROID 75 MCG tablet, Take  by mouth Daily., Disp: , Rfl: 5    Allergies   Allergen Reactions   • Bactrim [Sulfamethoxazole-Trimethoprim] Swelling   • Ciprofloxacin Swelling   • Morphine And Related Nausea And Vomiting   • Bacitracin Swelling   • Codeine Rash   •  "Gentamicin Rash   • Latex Rash     Patient states \" my skin comes off\"   • Naproxen Dizziness   • Penicillins Rash   • Percocet [Oxycodone-Acetaminophen] Rash   • Sulfa Antibiotics Rash   • Ultram [Tramadol Hcl] Rash       Review of Systems   Constitutional: Negative for fever.   HENT: Negative for voice change.    Eyes: Negative for visual disturbance.   Respiratory: Negative for shortness of breath.    Cardiovascular: Negative for chest pain.   Gastrointestinal: Negative for abdominal distention and abdominal pain.   Genitourinary: Negative for dysuria.   Musculoskeletal: Positive for arthralgias. Negative for gait problem and joint swelling.   Skin: Negative for rash.   Neurological: Negative for speech difficulty.   Hematological: Does not bruise/bleed easily.   Psychiatric/Behavioral: Negative for confusion.       Objective   Resp 18   Ht 157.5 cm (62\")   Wt 99.3 kg (219 lb)   BMI 40.06 kg/m²    Physical Exam   Constitutional: She is oriented to person, place, and time. She appears well-nourished.   Eyes: Conjunctivae are normal.   Pulmonary/Chest: No respiratory distress.   Musculoskeletal:        Left hip: She exhibits tenderness. She exhibits normal range of motion, normal strength and no crepitus.        Lumbar back: She exhibits tenderness. She exhibits no spasm.   Neurological: She is alert and oriented to person, place, and time.   Skin: Capillary refill takes less than 2 seconds.   Psychiatric: She has a normal mood and affect.   Vitals reviewed.    Right Hip Exam     Muscle Strength   Flexion: 4/5       Left Hip Exam     Tenderness   The patient is experiencing tenderness in the greater trochanter and posterior.    Range of Motion   Flexion: 100   Abduction: 40     Muscle Strength   Flexion: 4/5     Tests   MYLA: negative  Elizabeth: positive  Fadir:  Negative FADIR test    Other   Erythema: absent  Sensation: normal  Pulse: present      Back Exam     Tests   Straight leg raise right: negative  Straight " leg raise left: negative             Neurologic Exam     Mental Status   Oriented to person, place, and time.      Ortho Exam         Assessment/Plan   Independent Review of Radiographic Studies:    X-ray left hip reveals mild degenerative changes.  X-ray lumbar spine reveals severe degenerative changes at L4-L5-S1      Large Joint Arthrocentesis  Date/Time: 10/17/2018 2:56 PM  Consent given by: patient  Site marked: site marked  Timeout: Immediately prior to procedure a time out was called to verify the correct patient, procedure, equipment, support staff and site/side marked as required   Supporting Documentation  Indications: pain   Procedure Details  Location: hip - L greater trochanteric bursa  Preparation: Patient was prepped and draped in the usual sterile fashion  Needle size: 22 G  Approach: lateral  Medications administered: 2 mL lidocaine PF 1% 1 %; 40 mg methylPREDNISolone acetate 40 MG/ML  Patient tolerance: patient tolerated the procedure well with no immediate complications             Ban was seen today for pain.    Diagnoses and all orders for this visit:    Arthralgia of left hip  -     XR Spine Lumbar 2 or 3 View  -     Large Joint Arthrocentesis    Greater trochanteric bursitis, left    DDD (degenerative disc disease), lumbar       Orthopedic activities reviewed and patient expressed appreciation  Discussion of orthopedic goals  Risk, benefits, and merits of treatment alternatives reviewed with the patient and questions answered  Call or notify for any adverse effect from injection therapy    Recommendations/Plan:  Exercise, medications, injections, other patient advice, and return appointment as noted.  Patient is encouraged to call or return for any issues or concerns.    If patient does not obtain significant relief from the left hip bursa injection may consider MRI of the lumbar spine as well as MRI of the left hip.  I feel she does have symptoms radiating from the lumbar degenerative disc  disease.  Patient agreeable to call or return sooner for any concerns.

## 2018-11-16 PROBLEM — D25.9 FIBROID, UTERINE: Status: ACTIVE | Noted: 2018-11-16

## (undated) DEVICE — RICH MINOR LITHOTOMY: Brand: MEDLINE INDUSTRIES, INC.

## (undated) DEVICE — ST IRR CYSTO W/SPK 77IN LF

## (undated) DEVICE — SUT GUT CHRM 2/0 SH 27IN G123H

## (undated) DEVICE — GLV SURG BIOGEL M LTX PF 6 1/2

## (undated) DEVICE — SOL NACL 0.9PCT 1000ML